# Patient Record
Sex: FEMALE | Race: BLACK OR AFRICAN AMERICAN | NOT HISPANIC OR LATINO | Employment: OTHER | URBAN - METROPOLITAN AREA
[De-identification: names, ages, dates, MRNs, and addresses within clinical notes are randomized per-mention and may not be internally consistent; named-entity substitution may affect disease eponyms.]

---

## 2023-01-27 PROBLEM — I63.9 CVA (CEREBRAL VASCULAR ACCIDENT): Status: ACTIVE | Noted: 2023-01-27

## 2023-01-27 PROBLEM — I10 ESSENTIAL HYPERTENSION: Status: ACTIVE | Noted: 2023-01-27

## 2023-01-27 PROBLEM — E89.0 H/O PARTIAL THYROIDECTOMY: Status: ACTIVE | Noted: 2023-01-27

## 2023-01-27 PROBLEM — I69.359: Status: ACTIVE | Noted: 2023-01-27

## 2023-01-27 PROBLEM — F31.9 BIPOLAR AND RELATED DISORDER: Status: ACTIVE | Noted: 2023-01-27

## 2023-02-03 PROBLEM — A53.0 POSITIVE RPR TEST: Status: ACTIVE | Noted: 2023-02-03

## 2023-04-28 PROBLEM — B18.2 CHRONIC HEPATITIS C WITHOUT HEPATIC COMA: Status: ACTIVE | Noted: 2023-04-28

## 2023-04-28 PROBLEM — K76.0 HEPATIC STEATOSIS: Status: ACTIVE | Noted: 2023-04-28

## 2023-04-28 PROBLEM — K74.02 HEPATIC FIBROSIS, STAGE 3: Status: ACTIVE | Noted: 2023-04-28

## 2023-05-01 PROBLEM — I63.9 CVA (CEREBRAL VASCULAR ACCIDENT): Status: RESOLVED | Noted: 2023-01-27 | Resolved: 2023-05-01

## 2023-05-02 ENCOUNTER — TELEPHONE (OUTPATIENT)
Dept: PHARMACY | Facility: CLINIC | Age: 63
End: 2023-05-02

## 2023-05-02 ENCOUNTER — SPECIALTY PHARMACY (OUTPATIENT)
Dept: PHARMACY | Facility: CLINIC | Age: 63
End: 2023-05-02

## 2023-05-02 NOTE — TELEPHONE ENCOUNTER
BRAND Epclusa test claim (DAW9) - PA required.     BRAND Epclusa PA submitted (Key: [id number] - PA Case ID: [id number]).

## 2023-05-02 NOTE — TELEPHONE ENCOUNTER
Tariq, this is Dianelys Eldridge, clinical pharmacist with [facility] that is part of your care team.  We have begun working on your prescription [EPCLUSA] that your doctor has sent us. Our next steps include:     Working with your insurance company to obtain approval for your medication  Working with you to ensure your medication is affordable     We will be calling you along the way with updates on your medication but if you have any concerns or receive information that you would like to discuss please reach us at [phone].    Welcome call outcome: Patient/caregiver reached.

## 2023-05-03 ENCOUNTER — SPECIALTY PHARMACY (OUTPATIENT)
Dept: PHARMACY | Facility: CLINIC | Age: 63
End: 2023-05-03

## 2023-05-03 RX ORDER — LANOLIN ALCOHOL/MO/W.PET/CERES
400 CREAM (GRAM) TOPICAL 2 TIMES DAILY
COMMUNITY

## 2023-05-03 RX ORDER — METFORMIN HYDROCHLORIDE 500 MG/1
500 TABLET ORAL 2 TIMES DAILY WITH MEALS
COMMUNITY

## 2023-05-03 RX ORDER — LOSARTAN POTASSIUM 50 MG/1
50 TABLET ORAL DAILY
COMMUNITY

## 2023-05-03 RX ORDER — TOPIRAMATE 50 MG/1
50 TABLET, FILM COATED ORAL 2 TIMES DAILY
COMMUNITY

## 2023-05-03 RX ORDER — DICLOFENAC SODIUM 10 MG/G
2 GEL TOPICAL 2 TIMES DAILY PRN
COMMUNITY

## 2023-05-03 RX ORDER — FAMOTIDINE 20 MG/1
20 TABLET, FILM COATED ORAL DAILY
COMMUNITY

## 2023-05-03 NOTE — TELEPHONE ENCOUNTER
GERI GOYAL approved through 7/25/23. GERI Ponce test claim - $0 copay. Benefits investigation: [employer] - LIS LVL 2. No assistance needed.

## 2023-05-03 NOTE — TELEPHONE ENCOUNTER
Specialty Pharmacy - Initial Clinical Assessment    Specialty Medication Orders Linked to Encounter      Flowsheet Row Most Recent Value   Medication #1 sofosbuvir-velpatasvir (EPCLUSA) 400-100 mg Tab (Order#[id number], Rx#[id number])          Patient Diagnosis   B18.2 - Chronic hepatitis C without hepatic coma    Subjective    Pauline Rose is a 62 y.o. female, who is followed by the specialty pharmacy service for management and education.    Recent Encounters       Date Type Provider Description    05/03/2023 Specialty Pharmacy Dianelys Eldridge PharmD Initial Clinical Assessment    05/02/2023 Specialty Pharmacy Dianelys Eldridge, Fausto Referral Authorization            Current Outpatient Medications   Medication Sig    diclofenac sodium (VOLTAREN) 1 % Gel Apply 2 g topically 2 (two) times daily as needed.    famotidine (PEPCID) 20 MG tablet Take 20 mg by mouth once daily.    losartan (COZAAR) 50 MG tablet Take 50 mg by mouth once daily.    magnesium oxide (MAG-OX) 400 mg (241.3 mg magnesium) tablet Take 400 mg by mouth 2 (two) times a day.    metFORMIN (GLUCOPHAGE) 500 MG tablet Take 500 mg by mouth 2 (two) times daily with meals.    topiramate (TOPAMAX) 50 MG tablet Take 50 mg by mouth 2 (two) times daily.    atorvastatin (LIPITOR) 40 MG tablet Take 1 tablet (40 mg total) by mouth every evening.    carvediloL (COREG) 6.25 MG tablet Take 1 tablet (6.25 mg total) by mouth 2 (two) times daily.    divalproex (DEPAKOTE) 500 MG TbEC Take 1 tablet (500 mg total) by mouth 2 (two) times a day.    hydroCHLOROthiazide (HYDRODIURIL) 25 MG tablet Take 1 tablet (25 mg total) by mouth once daily.    latanoprost 0.005 % ophthalmic solution Place 1 drop into the left eye every evening.    NIFEdipine (PROCARDIA-XL) 90 MG (OSM) 24 hr tablet Take 1 tablet (90 mg total) by mouth every evening.    risperiDONE (RISPERDAL) 0.5 MG Tab Take 1 tablet (0.5 mg total) by mouth every evening.    rivaroxaban (XARELTO) 10 mg Tab Take 1 tablet  (10 mg total) by mouth daily with dinner or evening meal.    sofosbuvir-velpatasvir (EPCLUSA) 400-100 mg Tab Take 1 tablet by mouth once daily. Take Epclusa at the same time as Famotidine (Pepcid) 20 mg. Separate Magnesium Oxide 4 hours away from Epclusa.   Last reviewed on 4/28/2023  9:06 AM by Sallie Garcia MA    Review of patient's allergies indicates:   Allergen Reactions    Iodinated contrast media Shortness Of Breath    Iodine    Last reviewed on  4/28/2023 9:05 AM by Sallie Garcia    Drug Interactions    Drug interactions evaluated: yes  Clinically relevant drug interactions identified: yes   Interactions list: Lipitor, Pepcid, Mag Ox     Drug management plan: Lipitor 40 mg daily. Monitor for new onset muscle pain or weakness. Appropriate to continue. Dose cannot be increased. Famotidine 20 mg daily: MUST be taken at the same time as Epclusa dose. Mag Ox BID: MUST separate 4 hours before/after Epclusa dose.             Adverse Effects    *All other systems reviewed and are negative       Assessment Questions - Documented Responses      Flowsheet Row Most Recent Value   Assessment    Medication Reconciliation completed for patient Yes   During the past 4 weeks, has patient missed any activities due to condition or medication? No   During the past 4 weeks, did patient have any of the following urgent care visits? None   Goals of Therapy Status Discussed (new start)   Status of the patients ability to self-administer: Is Able   All education points have been covered with patient? Yes, supplemental printed education provided   Welcome packet contents reviewed and discussed with patient? Yes   Assesment completed? Yes   Plan Therapy being initiated   Do you need to open a clinical intervention (i-vent)? No   Do you want to schedule first shipment? Yes   Medication #1 Assessment Info    Patient status New medication, New to [facility]   Is this medication appropriate for the patient? Yes   Is this medication effective? Not  "yet started          Refill Questions - Documented Responses      Flowsheet Row Most Recent Value   Patient Availability and HIPAA Verification    Does patient want to proceed with activity? Yes   HIPAA/medical authority confirmed? Yes   Relationship to patient of person spoken to? Self   Refill Screening Questions    When does the patient need to receive the medication? 05/06/23   Refill Delivery Questions    How will the patient receive the medication? Mail   When does the patient need to receive the medication? 05/06/23   Shipping Address Home   Address in [facility] confirmed and updated if neccessary? Yes   Expected Copay ($) 0   Is the patient able to afford the medication copay? Yes   Payment Method zero copay   Days supply of Refill 28   Supplies needed? No supplies needed   Refill activity completed? Yes   Refill activity plan Refill scheduled   Shipment/Pickup Date: 05/04/23            Objective    She has no past medical history on file.    Tried/failed medications: NONE    BP Readings from Last 4 Encounters:   04/28/23 (!) 158/70   04/21/23 (!) 166/79   01/27/23 139/68     Ht Readings from Last 4 Encounters:   04/28/23 5' 4" (1.626 m)   04/21/23 5' 4" (1.626 m)     Wt Readings from Last 4 Encounters:   04/28/23 87.8 kg (193 lb 9.6 oz)   04/21/23 87.9 kg (193 lb 12.8 oz)     No results found for: HCVGENOTYPE  Recent Labs   Lab Result Units 04/21/23  1122   Creatinine mg/dL 0.9   ALT U/L 19   AST U/L 24   Hep B S Ab  <3.31  Non-reactive   Hep B Core Total Ab  Non-reactive     The goals of Hepatitis C Virus (HCV) infection treatment include:  Reducing all-cause mortality and liver-related health adverse consequences, including cirrhosis, end-stage liver disease, and hepatocellular carcinoma  Achieving virologic cure as evidenced by a sustained virologic response  Improving or maintaining quality of life  Maintaining optimal therapy adherence  Minimizing and managing side effects  Preventing the " transmission of HCV      Goals of Therapy Status: Discussed (new start)    Assessment/Plan  Patient plans to start therapy on 05/06/23      Indication, dosage, appropriateness, effectiveness, safety and convenience of her specialty medication(s) were reviewed today.     Patient Education   Patient received education on the following:   Expectations and possible outcomes of therapy  Proper use, timely administration, and missed dose management  Duration of therapy  Side effects, including prevention, minimization, and management  Contraindications and safety precautions  New or changed medications, including prescribe and over the counter medications and supplements  Reviews recommended vaccinations, as appropriate  Storage, safe handling, and disposal    BRAND Epclusa 400/100mg - Take one tablet by mouth daily x 12 weeks.   Counseling was reviewed:  Patient MUST take Epclusa at the SAME time every day.  Patient MUST avoid acid reducers without consulting with myself or provider first.   Potential Side effects include: headaches and fatigue. Headache: Patient may treat with OTC remedies. If Tylenol is used, dose should not exceed 2000mg per day.   Allergies reviewed and medication reconciliation complete (reviewed and documented in [facility] and [facility]). Patient MUST contact myself or provider prior to starting any new OTC, herbal, or prescription drugs to avoid potential DDIs.     DDI: Medication list reviewed and potential DDIs addressed.  - Lipitor 40 mg daily. Monitor for new onset muscle pain or weakness. Appropriate to continue. Dose cannot be increased.   - Famotidine 20 mg daily: MUST be taken at the same time as Epclusa dose.   - Mag Ox BID: MUST separate 4 hours before/after Epclusa dose.     Clinical Review:  Diagnosis: Chronic hepatitis C without hepatic coma [B18.2]  Weeks: 12 weeks   Genotype: 1a  HCV RNA: 6,477,639 (4/21/23)  Fibrosis: F3  CP: A   Renal: eGFR >60 mL/min   Treatment:  NAIVE  HBV: (-) serologies  Appropriate based on AASLD guidelines: Appropriate.     Patient plans to start Epclusa on 5/6/23.     Tasks added this encounter   5/12/2023 - New Therapy Check-in   Tasks due within next 3 months   No tasks due.     Dianelys Eldridge, PharmD  [facility]  [address]  [place] [place] [address]  Phone: [phone]  Fax: [phone]

## 2023-05-12 ENCOUNTER — SPECIALTY PHARMACY (OUTPATIENT)
Dept: PHARMACY | Facility: CLINIC | Age: 63
End: 2023-05-12

## 2023-05-12 NOTE — TELEPHONE ENCOUNTER
Patient return call - Epclusa started on 5/6- everyday at 9am- no miss doses and no issue. No question or concern.Continue to follow

## 2023-05-25 ENCOUNTER — SPECIALTY PHARMACY (OUTPATIENT)
Dept: PHARMACY | Facility: CLINIC | Age: 63
End: 2023-05-25

## 2023-05-25 NOTE — TELEPHONE ENCOUNTER
Specialty Pharmacy - Refill Coordination    Specialty Medication Orders Linked to Encounter      Flowsheet Row Most Recent Value   Medication #1 sofosbuvir-velpatasvir (EPCLUSA) 400-100 mg Tab (Order#[id number], Rx#[id number])        Epclusa  refill (2 of 3) confirmed and reassessment complete.     Patient has 7 doses of Epclusa remaining and takes it around 9am daily. Pt reports they are not having any side effects at this time. No missed doses, no new medications, no new allergies or health conditions reported at this time. Patient was using her mag -ox at the same time with epclusa.Advise patient to adjust to space at least 4 hours apart. Will plan to take her epclusa daily at 4am to space from mag ox.Would like to continue to use famotdine at night but advise need to space at least 12 hours apart from epclusa.  Allergies reviewed and medication reconciliation complete (reviewed and documented in [facility] and [facility]). Disease education reviewed (including transmission and prevention). Patient counseled on importance of maintaining adherence and keeping lab appointments which were scheduled. All questions answered and addressed to patients satisfaction. Advised to call [facility] and provider if any issues arise.       Refill Questions - Documented Responses      Flowsheet Row Most Recent Value   Patient Availability and HIPAA Verification    Does patient want to proceed with activity? Yes   HIPAA/medical authority confirmed? Yes   Relationship to patient of person spoken to? Self   Refill Screening Questions    Changes to allergies? No   Changes to medications? No   New conditions since last clinic visit? No   Unplanned office visit, urgent care, ED, or hospital admission in the last 4 weeks? No   How does patient/caregiver feel medication is working? Too soon to tell   Financial problems or insurance changes? No   How many doses of your specialty medications were missed in the last 4 weeks? 0   Would  patient like to speak to a pharmacist? No   When does the patient need to receive the medication? 06/02/23   Refill Delivery Questions    How will the patient receive the medication? Mail   When does the patient need to receive the medication? 06/02/23   Shipping Address Home   Address in [facility] confirmed and updated if neccessary? Yes   Expected Copay ($) 0   Is the patient able to afford the medication copay? Yes   Payment Method zero copay   Days supply of Refill 28   Supplies needed? No supplies needed   Refill activity completed? Yes   Refill activity plan Refill scheduled   Shipment/Pickup Date: 05/30/23            Current Outpatient Medications   Medication Sig    atorvastatin (LIPITOR) 40 MG tablet Take 1 tablet (40 mg total) by mouth every evening.    carvediloL (COREG) 6.25 MG tablet Take 1 tablet (6.25 mg total) by mouth 2 (two) times daily.    diclofenac sodium (VOLTAREN) 1 % Gel Apply 2 g topically 2 (two) times daily as needed.    divalproex (DEPAKOTE) 500 MG TbEC Take 1 tablet (500 mg total) by mouth 2 (two) times a day.    famotidine (PEPCID) 20 MG tablet Take 20 mg by mouth once daily.    hydroCHLOROthiazide (HYDRODIURIL) 25 MG tablet Take 1 tablet (25 mg total) by mouth once daily.    latanoprost 0.005 % ophthalmic solution Place 1 drop into the left eye every evening.    losartan (COZAAR) 50 MG tablet Take 50 mg by mouth once daily.    magnesium oxide (MAG-OX) 400 mg (241.3 mg magnesium) tablet Take 400 mg by mouth 2 (two) times a day.    metFORMIN (GLUCOPHAGE) 500 MG tablet Take 500 mg by mouth 2 (two) times daily with meals.    NIFEdipine (PROCARDIA-XL) 90 MG (OSM) 24 hr tablet Take 1 tablet (90 mg total) by mouth every evening.    risperiDONE (RISPERDAL) 0.5 MG Tab Take 1 tablet (0.5 mg total) by mouth every evening.    rivaroxaban (XARELTO) 10 mg Tab Take 1 tablet (10 mg total) by mouth daily with dinner or evening meal.    sofosbuvir-velpatasvir (EPCLUSA) 400-100 mg Tab Take 1  tablet by mouth once daily. Take Epclusa at the same time as Famotidine (Pepcid) 20 mg. Separate Magnesium Oxide 4 hours away from Epclusa.    topiramate (TOPAMAX) 50 MG tablet Take 50 mg by mouth 2 (two) times daily.   Last reviewed on 4/28/2023  9:06 AM by Sallie Garcia MA    Review of patient's allergies indicates:   Allergen Reactions    Iodinated contrast media Shortness Of Breath    Iodine     Last reviewed on  4/28/2023 9:05 AM by Sallie Garcia      Tasks added this encounter   No tasks added.   Tasks due within next 3 months   No tasks due.     Chauncey Song, PharmD  [facility]  [address] [address]  Phone: [phone]  Fax: [phone]

## 2023-05-31 ENCOUNTER — TELEPHONE (OUTPATIENT)
Dept: PHARMACY | Facility: CLINIC | Age: 63
End: 2023-05-31

## 2023-05-31 NOTE — TELEPHONE ENCOUNTER
Lvm for patient regarding epclusa shipment.  Received delivery delay from [employer], requested reattempt for today.

## 2023-06-13 ENCOUNTER — SPECIALTY PHARMACY (OUTPATIENT)
Dept: PHARMACY | Facility: CLINIC | Age: 63
End: 2023-06-13

## 2023-06-13 NOTE — TELEPHONE ENCOUNTER
Patient contacted [facility] stating that she dropped 4 of her Epclusa tablets in the sink. Reviewed medication adherence options with patient. She has about 8 days of tablets left on hand. Next fill will process on insurance plan as of 6/15. Will notify assigned pharmacist to contact patient 6/15 AM for refill scheduling.

## 2023-06-14 NOTE — TELEPHONE ENCOUNTER
Provider would like [facility] to attempt getting patient approved for a 4th BRAND Epclusa refill to replace the 4 lost doses. 28 DS Rx received. Released to [facility] and will submit PA prior to 4th refill being needed. MTP opened to discuss this with patient at call back for BRAND Epclusa refill 3 of 3 tomorrow (6/15/23).

## 2023-06-15 NOTE — TELEPHONE ENCOUNTER
Specialty Pharmacy - Refill Coordination  Specialty Pharmacy - Clinical Intervention    BRAND EPCLUSA REFILL 3 of 4  Specialty Medication Orders Linked to Encounter      Flowsheet Row Most Recent Value   Medication #1 sofosbuvir-velpatasvir (EPCLUSA) 400-100 mg Tab (Order#[id number], Rx#[id number])          BRAND Epclusa refill (3 of 4) confirmed and reassessment complete.     Patient has 6 doses of Epclusa remaining and takes it around 4 AM daily. Pt reports they are not having any side effects at this time. No missed doses, no new medications, no new allergies or health conditions reported at this time. Allergies reviewed and medication reconciliation complete (reviewed and documented in [facility] and [place]). Patient has been taking Pepcid (Famotidine) before bedtime (not at the same time as Epclusa). [facility] advised that Pepcid must be taken with the Epclusa dose to avoid DDI. Patient verbalized understanding that she should take Pepcid at 4 AM with Epclusa.     Provider would like [facility] to attempt getting patient approved for a 4th BRAND Epclusa refill to replace the 4 lost doses. 28 DS Rx received. Released to [facility] and will submit PA prior to 4th refill being needed. Patient verbalized understanding to this plan.     Disease education reviewed (including transmission and prevention). Patient counseled on importance of maintaining adherence and keeping lab appointments which were scheduled. All questions answered and addressed to patients satisfaction. Advised to call [facility] and provider if any issues arise.     Refill Questions - Documented Responses      Flowsheet Row Most Recent Value   Patient Availability and HIPAA Verification    Does patient want to proceed with activity? Yes   HIPAA/medical authority confirmed? Yes   Relationship to patient of person spoken to? Self   Refill Screening Questions    Changes to allergies? No   Changes to medications? No   New conditions since last clinic visit? No    Unplanned office visit, urgent care, ED, or hospital admission in the last 4 weeks? No   How does patient/caregiver feel medication is working? Too soon to tell   Financial problems or insurance changes? No   How many doses of your specialty medications were missed in the last 4 weeks? 0   When does the patient need to receive the medication? 06/21/23   Refill Delivery Questions    How will the patient receive the medication? Mail   When does the patient need to receive the medication? 06/21/23   Shipping Address Home   Address in [facility] confirmed and updated if neccessary? Yes   Expected Copay ($) 0   Is the patient able to afford the medication copay? Yes   Payment Method zero copay   Days supply of Refill 28   Supplies needed? No supplies needed   Refill activity completed? Yes   Refill activity plan Refill scheduled   Shipment/Pickup Date: 06/19/23            Current Outpatient Medications   Medication Sig    atorvastatin (LIPITOR) 40 MG tablet Take 1 tablet (40 mg total) by mouth every evening.    carvediloL (COREG) 6.25 MG tablet Take 1 tablet (6.25 mg total) by mouth 2 (two) times daily.    diclofenac sodium (VOLTAREN) 1 % Gel Apply 2 g topically 2 (two) times daily as needed.    divalproex (DEPAKOTE) 500 MG TbEC Take 1 tablet (500 mg total) by mouth 2 (two) times a day.    famotidine (PEPCID) 20 MG tablet Take 20 mg by mouth once daily.    hydroCHLOROthiazide (HYDRODIURIL) 25 MG tablet Take 1 tablet (25 mg total) by mouth once daily.    latanoprost 0.005 % ophthalmic solution Place 1 drop into the left eye every evening.    losartan (COZAAR) 50 MG tablet Take 50 mg by mouth once daily.    magnesium oxide (MAG-OX) 400 mg (241.3 mg magnesium) tablet Take 400 mg by mouth 2 (two) times a day.    metFORMIN (GLUCOPHAGE) 500 MG tablet Take 500 mg by mouth 2 (two) times daily with meals.    NIFEdipine (PROCARDIA-XL) 90 MG (OSM) 24 hr tablet Take 1 tablet (90 mg total) by mouth every evening.     risperiDONE (RISPERDAL) 0.5 MG Tab Take 1 tablet (0.5 mg total) by mouth every evening.    rivaroxaban (XARELTO) 10 mg Tab Take 1 tablet (10 mg total) by mouth daily with dinner or evening meal.    sofosbuvir-velpatasvir (EPCLUSA) 400-100 mg Tab Take 1 tablet by mouth once daily. Take Epclusa at the same time as Famotidine (Pepcid) 20 mg. Separate Magnesium Oxide 4 hours away from Epclusa.    sofosbuvir-velpatasvir (EPCLUSA) 400-100 mg Tab Take 1 tablet by mouth once daily.    topiramate (TOPAMAX) 50 MG tablet Take 50 mg by mouth 2 (two) times daily.   Last reviewed on 4/28/2023  9:06 AM by Sallie Garcia MA    Review of patient's allergies indicates:   Allergen Reactions    Iodinated contrast media Shortness Of Breath    Iodine     Last reviewed on  6/14/2023 10:25 AM by Emily Saba      Tasks added this encounter   No tasks added.   Tasks due within next 3 months   No tasks due.     Dianelys Eldridge, PharmD  [facility]  [address] [address]  Phone: [phone]  Fax: [phone]

## 2023-06-16 PROBLEM — Z86.73 HX OF ISCHEMIC RIGHT MCA STROKE: Status: ACTIVE | Noted: 2023-06-16

## 2023-06-16 PROBLEM — F17.210 TOBACCO DEPENDENCE DUE TO CIGARETTES: Status: ACTIVE | Noted: 2023-06-16

## 2023-07-10 ENCOUNTER — SPECIALTY PHARMACY (OUTPATIENT)
Dept: PHARMACY | Facility: CLINIC | Age: 63
End: 2023-07-10

## 2023-07-10 NOTE — TELEPHONE ENCOUNTER
Incoming call from patient for on hand count. States she currently has 15 tablets on hand of Epclusa. Notified her that we are working on obtaining a PA for the 4th bottle of Epclusa and we will contact her once its approved.

## 2023-07-12 NOTE — TELEPHONE ENCOUNTER
Specialty Pharmacy - Refill Coordination  Specialty Pharmacy - Medication/Referral Authorization    BRAND EPCLUSA REFILL 4 of 4  Specialty Medication Orders Linked to Encounter      Flowsheet Row Most Recent Value   Medication #1 sofosbuvir-velpatasvir (EPCLUSA) 400-100 mg Tab (Order#[id number], Rx#[id number])          BRAND Epclusa refill (4 of 4) confirmed and reassessment complete.     Patient has 13 doses of Epclusa remaining and takes it around the AM time daily (dose count is appropriate based on start date and lost 4 tablets). Patient now takes doses while she is laying in bed to avoid dropping any further doses. Pt reports they are not having any side effects at this time. No missed doses, no new allergies or health conditions reported at this time. Allergies reviewed and medication reconciliation complete (reviewed and documented in [facility] and [facility]). Patient started Wegovy for weight loss (no DDIs). Patient continues to take Pepcid with Epclusa dose. Disease education reviewed (including transmission and prevention). Patient counseled on importance of maintaining adherence and keeping lab appointments which were scheduled. All questions answered and addressed to patients satisfaction. Advised to call [facility] and provider if any issues arise.     Refill Questions - Documented Responses      Flowsheet Row Most Recent Value   Patient Availability and HIPAA Verification    Does patient want to proceed with activity? Yes   HIPAA/medical authority confirmed? Yes   Relationship to patient of person spoken to? Self   Refill Screening Questions    Changes to allergies? No   Changes to medications? Yes   New conditions since last clinic visit? No   Unplanned office visit, urgent care, ED, or hospital admission in the last 4 weeks? No   How does patient/caregiver feel medication is working? Too soon to tell   Financial problems or insurance changes? No   How many doses of your specialty medications  were missed in the last 4 weeks? 0   When does the patient need to receive the medication? 07/25/23   Refill Delivery Questions    How will the patient receive the medication? Mail   When does the patient need to receive the medication? 07/25/23   Shipping Address Home   Address in [facility] confirmed and updated if neccessary? Yes   Expected Copay ($) 0   Is the patient able to afford the medication copay? Yes   Payment Method zero copay   Days supply of Refill 28   Supplies needed? No supplies needed   Refill activity completed? Yes   Refill activity plan Refill scheduled   Shipment/Pickup Date: 07/19/23            Current Outpatient Medications   Medication Sig    atorvastatin (LIPITOR) 40 MG tablet Take 1 tablet (40 mg total) by mouth every evening.    carvediloL (COREG) 6.25 MG tablet Take 1 tablet (6.25 mg total) by mouth 2 (two) times daily.    diclofenac sodium (VOLTAREN) 1 % Gel Apply 2 g topically 2 (two) times daily as needed.    divalproex (DEPAKOTE) 500 MG TbEC Take 1 tablet (500 mg total) by mouth 2 (two) times a day.    famotidine (PEPCID) 20 MG tablet Take 20 mg by mouth once daily.    hydroCHLOROthiazide (HYDRODIURIL) 25 MG tablet 1 tablet in the morning Orally Once a day for 90 days    ibuprofen (ADVIL,MOTRIN) 400 MG tablet TAKE 1 TABLET BY MOUTH EVERY SIX HOURS AS NEEDED FOR PAIN TAKE WITH FOOD TO PREVENT GI UPSET.    latanoprost 0.005 % ophthalmic solution latanoprost 0.005 % eye drops    losartan (COZAAR) 50 MG tablet Take 50 mg by mouth once daily.    magnesium oxide (MAG-OX) 400 mg (241.3 mg magnesium) tablet Take 400 mg by mouth 2 (two) times a day.    metFORMIN (GLUCOPHAGE) 500 MG tablet Take 500 mg by mouth 2 (two) times daily with meals.    NIFEdipine (PROCARDIA-XL) 90 MG (OSM) 24 hr tablet Take 1 tablet (90 mg total) by mouth every evening.    phentermine (ADIPEX-P) 37.5 mg tablet Take 37.5 mg by mouth.    risperiDONE (RISPERDAL) 0.5 MG Tab Take 1 tablet (0.5 mg total) by mouth  every evening.    rivaroxaban (XARELTO) 10 mg Tab Take 1 tablet (10 mg total) by mouth daily with dinner or evening meal.    sofosbuvir-velpatasvir (EPCLUSA) 400-100 mg Tab Take 1 tablet by mouth once daily. Take Epclusa at the same time as Famotidine (Pepcid) 20 mg. Separate Magnesium Oxide 4 hours away from Epclusa.    sofosbuvir-velpatasvir (EPCLUSA) 400-100 mg Tab Take 1 tablet by mouth once daily.    topiramate (TOPAMAX) 50 MG tablet Take 50 mg by mouth 2 (two) times daily.   Last reviewed on 6/16/2023  9:33 AM by Irena Cunningham MA    Review of patient's allergies indicates:   Allergen Reactions    Iodinated contrast media Shortness Of Breath    Iodine     Last reviewed on  6/16/2023 9:28 AM by Irena Cunningham      Tasks added this encounter   7/12/2024 - Benefits Review (Annual recurrence)   Tasks due within next 3 months   No tasks due.     Dianelys Eldridge, PharmD  [facility]  [address] [place] [id number]  Phone: [phone]  Fax: [phone]

## 2023-07-12 NOTE — TELEPHONE ENCOUNTER
GERI GOYAL approved. PA Case: [id number], Status: Approved, Coverage Starts on: 7/10/2023 12:00:00 AM, Coverage Ends on: 10/2/2023 12:00:00 AM.     GERI Ponce test claim - $0 copay. Benefits investigation: [facility] [employer].

## 2023-12-12 PROBLEM — N39.41 URGENCY INCONTINENCE: Status: ACTIVE | Noted: 2023-12-12
